# Patient Record
Sex: MALE | Race: WHITE | NOT HISPANIC OR LATINO
[De-identification: names, ages, dates, MRNs, and addresses within clinical notes are randomized per-mention and may not be internally consistent; named-entity substitution may affect disease eponyms.]

---

## 2017-01-21 PROBLEM — Z00.00 ENCOUNTER FOR PREVENTIVE HEALTH EXAMINATION: Status: ACTIVE | Noted: 2017-01-21

## 2024-03-17 ENCOUNTER — APPOINTMENT (OUTPATIENT)
Dept: ORTHOPEDIC SURGERY | Facility: CLINIC | Age: 64
End: 2024-03-17
Payer: COMMERCIAL

## 2024-03-17 VITALS — BODY MASS INDEX: 27.2 KG/M2 | HEIGHT: 70 IN | WEIGHT: 190 LBS

## 2024-03-17 DIAGNOSIS — S22.31XS FRACTURE OF ONE RIB, RIGHT SIDE, SEQUELA: ICD-10-CM

## 2024-03-17 PROCEDURE — A4565: CPT

## 2024-03-17 PROCEDURE — 99203 OFFICE O/P NEW LOW 30 MIN: CPT

## 2024-03-17 NOTE — ASSESSMENT
[FreeTextEntry1] : Outside XR reviewed with nondisplaced radial head fx, R rib fx Recommend sling use for a few days ROM encourage ice nsaids fu 1-2 weeks hand/upper extremity MR for repeat XR

## 2024-03-17 NOTE — IMAGING
[de-identified] : R elbow mild swelling mild ttp radial head full ROM pain with pronation/supination  neuro intact

## 2024-03-17 NOTE — HISTORY OF PRESENT ILLNESS
[0] : 0 [5] : 5 [Dull/Aching] : dull/aching [Intermittent] : intermittent [Localized] : localized [de-identified] : 63M here with right elbow and rib pain. he fell 3/16. Had XR at  and told he had a fx. [] : Post Surgical Visit: no [FreeTextEntry1] : R elbow [FreeTextEntry3] : 3/16/24 [FreeTextEntry5] : Patient fell down outside on concrete playing with his grandkids yesterday. Patient went to an urgent care and brought x rays, was told he has a fracture. also has a right rib fracture. [de-identified] : activity

## 2024-03-22 ENCOUNTER — APPOINTMENT (OUTPATIENT)
Dept: ORTHOPEDIC SURGERY | Facility: CLINIC | Age: 64
End: 2024-03-22
Payer: COMMERCIAL

## 2024-03-22 VITALS — WEIGHT: 190 LBS | BODY MASS INDEX: 27.2 KG/M2 | HEIGHT: 70 IN

## 2024-03-22 DIAGNOSIS — S52.124A NONDISPLACED FRACTURE OF HEAD OF RIGHT RADIUS, INITIAL ENCOUNTER FOR CLOSED FRACTURE: ICD-10-CM

## 2024-03-22 DIAGNOSIS — Z78.9 OTHER SPECIFIED HEALTH STATUS: ICD-10-CM

## 2024-03-22 PROCEDURE — 99203 OFFICE O/P NEW LOW 30 MIN: CPT | Mod: 57

## 2024-03-22 PROCEDURE — 24650 CLTX RDL HEAD/NCK FX WO MNPJ: CPT | Mod: RT

## 2024-03-22 PROCEDURE — 73080 X-RAY EXAM OF ELBOW: CPT | Mod: RT

## 2024-03-22 NOTE — DISCUSSION/SUMMARY
[de-identified] : Discussed the nature of the diagnosis and risk and benefits of different modalities of treatment. NWB ROM F/u in 2 weeks for xrays

## 2024-03-22 NOTE — HISTORY OF PRESENT ILLNESS
[5] : 5 [Dull/Aching] : dull/aching [0] : 0 [Localized] : localized [Intermittent] : intermittent [de-identified] : 62 y/o male presents for right elbow.  Pt tripped over his grandchild on 3/16/24 and injured his right elbow.  Was seen at urgent care, then at [facility] urgent care.  Diagnosed with proximal radial fracture [] : Post Surgical Visit: no [FreeTextEntry1] : R elbow [FreeTextEntry3] : 3/16/24 [FreeTextEntry5] : 63M here with right elbow and rib pain. he fell 3/16. Had XR at  and told he had a fx. [de-identified] : activity

## 2024-03-22 NOTE — PHYSICAL EXAM
[] : tenderness over radial head [Right] : right elbow [FreeTextEntry3] : ecchymosis medially [FreeTextEntry1] : radial head fracture in acceptable alignment degenerative changes [FreeTextEntry9] : ROM is 0-130 supination to 70 pronation to 80 [TWNoteComboBox7] : False

## 2024-04-05 ENCOUNTER — APPOINTMENT (OUTPATIENT)
Dept: ORTHOPEDIC SURGERY | Facility: CLINIC | Age: 64
End: 2024-04-05
Payer: COMMERCIAL

## 2024-04-05 VITALS — BODY MASS INDEX: 27.2 KG/M2 | HEIGHT: 70 IN | WEIGHT: 190 LBS

## 2024-04-05 PROCEDURE — 99024 POSTOP FOLLOW-UP VISIT: CPT

## 2024-04-05 PROCEDURE — 73080 X-RAY EXAM OF ELBOW: CPT | Mod: RT

## 2024-04-05 NOTE — DISCUSSION/SUMMARY
[de-identified] : Discussed the nature of the diagnosis and risk and benefits of different modalities of treatment. Repeat x-rays reviewed and discussed. NWB. AROM of the elbow.  RTO 3 weeks.

## 2024-04-05 NOTE — HISTORY OF PRESENT ILLNESS
[Dull/Aching] : dull/aching [Localized] : localized [Intermittent] : intermittent [de-identified] : 62 y/o male followed for a RT radial head fx. DOI: 3/16/24  [] : Post Surgical Visit: no [FreeTextEntry1] : R elbow [FreeTextEntry3] : 3/16/24 [FreeTextEntry5] : pain is improving [de-identified] : activity

## 2024-05-03 ENCOUNTER — APPOINTMENT (OUTPATIENT)
Dept: ORTHOPEDIC SURGERY | Facility: CLINIC | Age: 64
End: 2024-05-03
Payer: COMMERCIAL

## 2024-05-03 VITALS — WEIGHT: 190 LBS | BODY MASS INDEX: 27.2 KG/M2 | HEIGHT: 70 IN

## 2024-05-03 DIAGNOSIS — S52.124D NONDISPLACED FRACTURE OF HEAD OF RIGHT RADIUS, SUBSEQUENT ENCOUNTER FOR CLOSED FRACTURE WITH ROUTINE HEALING: ICD-10-CM

## 2024-05-03 PROCEDURE — 99024 POSTOP FOLLOW-UP VISIT: CPT

## 2024-05-06 NOTE — HISTORY OF PRESENT ILLNESS
[Dull/Aching] : dull/aching [Localized] : localized [Intermittent] : intermittent [de-identified] : 64 y/o male followed for a RT radial head fx. NWB and AROM.  DOI: 3/16/24  [] : Post Surgical Visit: no [FreeTextEntry1] : R elbow [FreeTextEntry3] : 3/16/24 [FreeTextEntry5] : pain is improving [de-identified] : activity

## 2024-05-06 NOTE — DISCUSSION/SUMMARY
[de-identified] : Discussed the nature of the diagnosis and risk and benefits of different modalities of treatment. Repeat x-rays reviewed. PRN.

## 2024-09-30 ENCOUNTER — APPOINTMENT (OUTPATIENT)
Dept: GASTROENTEROLOGY | Facility: CLINIC | Age: 64
End: 2024-09-30
Payer: COMMERCIAL

## 2024-09-30 VITALS
DIASTOLIC BLOOD PRESSURE: 70 MMHG | HEART RATE: 83 BPM | WEIGHT: 188 LBS | TEMPERATURE: 97.5 F | SYSTOLIC BLOOD PRESSURE: 100 MMHG | HEIGHT: 70 IN | BODY MASS INDEX: 26.92 KG/M2 | OXYGEN SATURATION: 97 %

## 2024-09-30 DIAGNOSIS — Z12.10 ENCOUNTER FOR SCREENING FOR MALIGNANT NEOPLASM OF INTESTINAL TRACT, UNSPECIFIED: ICD-10-CM

## 2024-09-30 PROCEDURE — 99203 OFFICE O/P NEW LOW 30 MIN: CPT

## 2024-09-30 NOTE — ASSESSMENT
[FreeTextEntry1] : The patient is a 63-year-old male with a history of prostate cancer.  George is due for repeat colonoscopy which will be scheduled at his earliest convenience.  Once performed, I will distribute a copy of the results.

## 2024-09-30 NOTE — HISTORY OF PRESENT ILLNESS
[FreeTextEntry1] : I saw patient George Dumont in the office today.  The patient is a 63-year-old male who has no history of hypertension, diabetes or coronary issues.  The patient is on a lipid-lowering agent for an elevated calcium score.  Patient also had a history of prostate cancer had a total prostatectomy.  He is under the care of a urologist.  George drinks 4 caffeinated beverages a day, does not drink alcohol and he does not smoke.  The patient is due for repeat colonoscopy.  The patient denies significant nocturnal urination at the present time.  George's appetite is good with no dysphagia or unexplained weight loss.  Patient states his bowel movements are normal with no blood in the stool or on the toilet tissue.

## 2025-01-28 ENCOUNTER — APPOINTMENT (OUTPATIENT)
Dept: GASTROENTEROLOGY | Facility: AMBULATORY MEDICAL SERVICES | Age: 65
End: 2025-01-28
Payer: COMMERCIAL

## 2025-01-28 PROCEDURE — 45385 COLONOSCOPY W/LESION REMOVAL: CPT | Mod: PT

## 2025-01-28 PROCEDURE — 45380 COLONOSCOPY AND BIOPSY: CPT | Mod: PT,59
